# Patient Record
Sex: MALE | Race: BLACK OR AFRICAN AMERICAN | Employment: UNEMPLOYED | URBAN - METROPOLITAN AREA
[De-identification: names, ages, dates, MRNs, and addresses within clinical notes are randomized per-mention and may not be internally consistent; named-entity substitution may affect disease eponyms.]

---

## 2020-05-05 ENCOUNTER — HOSPITAL ENCOUNTER (INPATIENT)
Age: 50
LOS: 2 days | Discharge: HOME | DRG: 194 | End: 2020-05-08
Attending: EMERGENCY MEDICINE | Admitting: INTERNAL MEDICINE
Payer: MEDICAID

## 2020-05-05 ENCOUNTER — APPOINTMENT (OUTPATIENT)
Dept: GENERAL RADIOLOGY | Age: 50
DRG: 194 | End: 2020-05-05
Payer: MEDICAID

## 2020-05-05 LAB
ALBUMIN SERPL-MCNC: 4 G/DL (ref 3.5–5.2)
ALP BLD-CCNC: 87 U/L (ref 40–129)
ALT SERPL-CCNC: 62 U/L (ref 0–40)
ANION GAP SERPL CALCULATED.3IONS-SCNC: 11 MMOL/L (ref 7–16)
AST SERPL-CCNC: 59 U/L (ref 0–39)
BASOPHILS ABSOLUTE: 0.05 E9/L (ref 0–0.2)
BASOPHILS RELATIVE PERCENT: 0.5 % (ref 0–2)
BILIRUB SERPL-MCNC: 0.6 MG/DL (ref 0–1.2)
BUN BLDV-MCNC: 21 MG/DL (ref 6–20)
CALCIUM SERPL-MCNC: 8.5 MG/DL (ref 8.6–10.2)
CHLORIDE BLD-SCNC: 101 MMOL/L (ref 98–107)
CO2: 24 MMOL/L (ref 22–29)
CREAT SERPL-MCNC: 1.4 MG/DL (ref 0.7–1.2)
D DIMER: 214 NG/ML DDU
EOSINOPHILS ABSOLUTE: 0.74 E9/L (ref 0.05–0.5)
EOSINOPHILS RELATIVE PERCENT: 7.7 % (ref 0–6)
GFR AFRICAN AMERICAN: >60
GFR NON-AFRICAN AMERICAN: >60 ML/MIN/1.73
GLUCOSE BLD-MCNC: 127 MG/DL (ref 74–99)
HCT VFR BLD CALC: 38.9 % (ref 37–54)
HEMOGLOBIN: 13.3 G/DL (ref 12.5–16.5)
IMMATURE GRANULOCYTES #: 0.04 E9/L
IMMATURE GRANULOCYTES %: 0.4 % (ref 0–5)
INR BLD: 1.1
LYMPHOCYTES ABSOLUTE: 2.26 E9/L (ref 1.5–4)
LYMPHOCYTES RELATIVE PERCENT: 23.6 % (ref 20–42)
MCH RBC QN AUTO: 29.9 PG (ref 26–35)
MCHC RBC AUTO-ENTMCNC: 34.2 % (ref 32–34.5)
MCV RBC AUTO: 87.4 FL (ref 80–99.9)
MONOCYTES ABSOLUTE: 1.16 E9/L (ref 0.1–0.95)
MONOCYTES RELATIVE PERCENT: 12.1 % (ref 2–12)
NEUTROPHILS ABSOLUTE: 5.34 E9/L (ref 1.8–7.3)
NEUTROPHILS RELATIVE PERCENT: 55.7 % (ref 43–80)
PDW BLD-RTO: 12.6 FL (ref 11.5–15)
PLATELET # BLD: 270 E9/L (ref 130–450)
PMV BLD AUTO: 9.6 FL (ref 7–12)
POTASSIUM REFLEX MAGNESIUM: 4.1 MMOL/L (ref 3.5–5)
PRO-BNP: 3661 PG/ML (ref 0–125)
PROTHROMBIN TIME: 12.5 SEC (ref 9.3–12.4)
RBC # BLD: 4.45 E12/L (ref 3.8–5.8)
SODIUM BLD-SCNC: 136 MMOL/L (ref 132–146)
TOTAL PROTEIN: 7.4 G/DL (ref 6.4–8.3)
TROPONIN: <0.01 NG/ML (ref 0–0.03)
WBC # BLD: 9.6 E9/L (ref 4.5–11.5)

## 2020-05-05 PROCEDURE — 85610 PROTHROMBIN TIME: CPT

## 2020-05-05 PROCEDURE — 93005 ELECTROCARDIOGRAM TRACING: CPT | Performed by: EMERGENCY MEDICINE

## 2020-05-05 PROCEDURE — 85378 FIBRIN DEGRADE SEMIQUANT: CPT

## 2020-05-05 PROCEDURE — 71045 X-RAY EXAM CHEST 1 VIEW: CPT

## 2020-05-05 PROCEDURE — 85025 COMPLETE CBC W/AUTO DIFF WBC: CPT

## 2020-05-05 PROCEDURE — 80053 COMPREHEN METABOLIC PANEL: CPT

## 2020-05-05 PROCEDURE — 83880 ASSAY OF NATRIURETIC PEPTIDE: CPT

## 2020-05-05 PROCEDURE — 84484 ASSAY OF TROPONIN QUANT: CPT

## 2020-05-05 PROCEDURE — 99285 EMERGENCY DEPT VISIT HI MDM: CPT

## 2020-05-05 RX ORDER — CLINDAMYCIN HYDROCHLORIDE 300 MG/1
300 CAPSULE ORAL 4 TIMES DAILY
Status: ON HOLD | COMMUNITY
End: 2020-05-08 | Stop reason: HOSPADM

## 2020-05-05 RX ORDER — MULTIVIT-MIN/FERROUS GLUCONATE 9 MG/15 ML
15 LIQUID (ML) ORAL DAILY
COMMUNITY

## 2020-05-05 RX ORDER — CARVEDILOL 3.12 MG/1
3.12 TABLET ORAL 2 TIMES DAILY WITH MEALS
Status: ON HOLD | COMMUNITY
End: 2020-05-08 | Stop reason: HOSPADM

## 2020-05-05 RX ORDER — FUROSEMIDE 40 MG/1
40 TABLET ORAL DAILY
COMMUNITY

## 2020-05-05 RX ORDER — BUPROPION HYDROCHLORIDE 150 MG/1
150 TABLET ORAL EVERY MORNING
COMMUNITY

## 2020-05-05 RX ORDER — QUETIAPINE FUMARATE 100 MG/1
100 TABLET, FILM COATED ORAL NIGHTLY
COMMUNITY

## 2020-05-05 RX ORDER — LISINOPRIL 5 MG/1
5 TABLET ORAL DAILY
Status: ON HOLD | COMMUNITY
End: 2020-05-08 | Stop reason: HOSPADM

## 2020-05-05 RX ORDER — M-VIT,TX,IRON,MINS/CALC/FOLIC 27MG-0.4MG
1 TABLET ORAL DAILY
Status: ON HOLD | COMMUNITY
End: 2020-05-08 | Stop reason: HOSPADM

## 2020-05-05 RX ORDER — ACETAMINOPHEN 160 MG
TABLET,DISINTEGRATING ORAL DAILY
COMMUNITY

## 2020-05-05 ASSESSMENT — ENCOUNTER SYMPTOMS
ABDOMINAL PAIN: 0
SORE THROAT: 0
COUGH: 0
NAUSEA: 0
VOMITING: 0
COLOR CHANGE: 0
SHORTNESS OF BREATH: 1

## 2020-05-06 PROBLEM — R07.89 ATYPICAL CHEST PAIN: Status: ACTIVE | Noted: 2020-05-06

## 2020-05-06 PROBLEM — I50.23 HEART FAILURE, SYSTOLIC, WITH ACUTE DECOMPENSATION (HCC): Status: ACTIVE | Noted: 2020-05-06

## 2020-05-06 PROBLEM — K04.7 ABSCESSED TOOTH: Status: ACTIVE | Noted: 2020-05-06

## 2020-05-06 PROBLEM — N17.9 AKI (ACUTE KIDNEY INJURY) (HCC): Status: ACTIVE | Noted: 2020-05-06

## 2020-05-06 PROBLEM — I50.43 ACUTE ON CHRONIC SYSTOLIC AND DIASTOLIC HEART FAILURE, NYHA CLASS 1 (HCC): Status: ACTIVE | Noted: 2020-05-06

## 2020-05-06 PROBLEM — F14.10 COCAINE ABUSE (HCC): Status: ACTIVE | Noted: 2020-05-06

## 2020-05-06 LAB
ALBUMIN SERPL-MCNC: 3.9 G/DL (ref 3.5–5.2)
ALP BLD-CCNC: 87 U/L (ref 40–129)
ALT SERPL-CCNC: 56 U/L (ref 0–40)
AMPHETAMINE SCREEN, URINE: NOT DETECTED
ANION GAP SERPL CALCULATED.3IONS-SCNC: 12 MMOL/L (ref 7–16)
AST SERPL-CCNC: 39 U/L (ref 0–39)
BACTERIA: NORMAL /HPF
BARBITURATE SCREEN URINE: NOT DETECTED
BENZODIAZEPINE SCREEN, URINE: NOT DETECTED
BILIRUB SERPL-MCNC: 0.6 MG/DL (ref 0–1.2)
BILIRUBIN DIRECT: <0.2 MG/DL (ref 0–0.3)
BILIRUBIN URINE: NEGATIVE
BILIRUBIN, INDIRECT: ABNORMAL MG/DL (ref 0–1)
BLOOD, URINE: NEGATIVE
BUN BLDV-MCNC: 20 MG/DL (ref 6–20)
CALCIUM SERPL-MCNC: 8.8 MG/DL (ref 8.6–10.2)
CANNABINOID SCREEN URINE: NOT DETECTED
CHLORIDE BLD-SCNC: 100 MMOL/L (ref 98–107)
CLARITY: CLEAR
CO2: 24 MMOL/L (ref 22–29)
COCAINE METABOLITE SCREEN URINE: NOT DETECTED
COLOR: YELLOW
CREAT SERPL-MCNC: 1.4 MG/DL (ref 0.7–1.2)
EKG ATRIAL RATE: 101 BPM
EKG ATRIAL RATE: 94 BPM
EKG P AXIS: 116 DEGREES
EKG P AXIS: 60 DEGREES
EKG P-R INTERVAL: 144 MS
EKG P-R INTERVAL: 150 MS
EKG Q-T INTERVAL: 400 MS
EKG Q-T INTERVAL: 406 MS
EKG QRS DURATION: 96 MS
EKG QRS DURATION: 96 MS
EKG QTC CALCULATION (BAZETT): 500 MS
EKG QTC CALCULATION (BAZETT): 526 MS
EKG R AXIS: -161 DEGREES
EKG R AXIS: -33 DEGREES
EKG T AXIS: 0 DEGREES
EKG T AXIS: 131 DEGREES
EKG VENTRICULAR RATE: 101 BPM
EKG VENTRICULAR RATE: 94 BPM
FENTANYL SCREEN, URINE: NOT DETECTED
GFR AFRICAN AMERICAN: >60
GFR NON-AFRICAN AMERICAN: >60 ML/MIN/1.73
GLUCOSE BLD-MCNC: 110 MG/DL (ref 74–99)
GLUCOSE URINE: NEGATIVE MG/DL
KETONES, URINE: NEGATIVE MG/DL
LEUKOCYTE ESTERASE, URINE: NEGATIVE
Lab: NORMAL
MAGNESIUM: 2.1 MG/DL (ref 1.6–2.6)
METHADONE SCREEN, URINE: NOT DETECTED
NITRITE, URINE: NEGATIVE
OPIATE SCREEN URINE: NOT DETECTED
OXYCODONE URINE: NOT DETECTED
PH UA: 6.5 (ref 5–9)
PHENCYCLIDINE SCREEN URINE: NOT DETECTED
POTASSIUM REFLEX MAGNESIUM: 3.6 MMOL/L (ref 3.5–5)
PROTEIN UA: NEGATIVE MG/DL
RBC UA: NORMAL /HPF (ref 0–2)
SEDIMENTATION RATE, ERYTHROCYTE: 1 MM/HR (ref 0–15)
SODIUM BLD-SCNC: 136 MMOL/L (ref 132–146)
SPECIFIC GRAVITY UA: 1.01 (ref 1–1.03)
TOTAL PROTEIN: 7 G/DL (ref 6.4–8.3)
TROPONIN: <0.01 NG/ML (ref 0–0.03)
UROBILINOGEN, URINE: 0.2 E.U./DL
WBC UA: NORMAL /HPF (ref 0–5)

## 2020-05-06 PROCEDURE — 80048 BASIC METABOLIC PNL TOTAL CA: CPT

## 2020-05-06 PROCEDURE — 99222 1ST HOSP IP/OBS MODERATE 55: CPT | Performed by: INTERNAL MEDICINE

## 2020-05-06 PROCEDURE — 2580000003 HC RX 258: Performed by: NURSE PRACTITIONER

## 2020-05-06 PROCEDURE — 80076 HEPATIC FUNCTION PANEL: CPT

## 2020-05-06 PROCEDURE — 2580000003 HC RX 258: Performed by: INTERNAL MEDICINE

## 2020-05-06 PROCEDURE — 6360000002 HC RX W HCPCS: Performed by: NURSE PRACTITIONER

## 2020-05-06 PROCEDURE — 85651 RBC SED RATE NONAUTOMATED: CPT

## 2020-05-06 PROCEDURE — 36415 COLL VENOUS BLD VENIPUNCTURE: CPT

## 2020-05-06 PROCEDURE — 80307 DRUG TEST PRSMV CHEM ANLYZR: CPT

## 2020-05-06 PROCEDURE — 6360000002 HC RX W HCPCS: Performed by: INTERNAL MEDICINE

## 2020-05-06 PROCEDURE — 81001 URINALYSIS AUTO W/SCOPE: CPT

## 2020-05-06 PROCEDURE — 6370000000 HC RX 637 (ALT 250 FOR IP): Performed by: INTERNAL MEDICINE

## 2020-05-06 PROCEDURE — 1200000000 HC SEMI PRIVATE

## 2020-05-06 PROCEDURE — 6370000000 HC RX 637 (ALT 250 FOR IP): Performed by: NURSE PRACTITIONER

## 2020-05-06 PROCEDURE — APPSS180 APP SPLIT SHARED TIME > 60 MINUTES: Performed by: NURSE PRACTITIONER

## 2020-05-06 PROCEDURE — 84484 ASSAY OF TROPONIN QUANT: CPT

## 2020-05-06 PROCEDURE — 87088 URINE BACTERIA CULTURE: CPT

## 2020-05-06 PROCEDURE — 87081 CULTURE SCREEN ONLY: CPT

## 2020-05-06 PROCEDURE — 83735 ASSAY OF MAGNESIUM: CPT

## 2020-05-06 PROCEDURE — 93005 ELECTROCARDIOGRAM TRACING: CPT | Performed by: INTERNAL MEDICINE

## 2020-05-06 RX ORDER — QUETIAPINE FUMARATE 100 MG/1
100 TABLET, FILM COATED ORAL NIGHTLY
Status: DISCONTINUED | OUTPATIENT
Start: 2020-05-06 | End: 2020-05-08 | Stop reason: HOSPADM

## 2020-05-06 RX ORDER — NICOTINE 21 MG/24HR
1 PATCH, TRANSDERMAL 24 HOURS TRANSDERMAL DAILY
Status: DISCONTINUED | OUTPATIENT
Start: 2020-05-06 | End: 2020-05-08 | Stop reason: HOSPADM

## 2020-05-06 RX ORDER — POTASSIUM CHLORIDE 20 MEQ/1
40 TABLET, EXTENDED RELEASE ORAL PRN
Status: DISCONTINUED | OUTPATIENT
Start: 2020-05-06 | End: 2020-05-08 | Stop reason: HOSPADM

## 2020-05-06 RX ORDER — FUROSEMIDE 10 MG/ML
20 INJECTION INTRAMUSCULAR; INTRAVENOUS 2 TIMES DAILY
Status: DISCONTINUED | OUTPATIENT
Start: 2020-05-06 | End: 2020-05-07

## 2020-05-06 RX ORDER — SODIUM CHLORIDE 9 MG/ML
INJECTION, SOLUTION INTRAVENOUS EVERY 12 HOURS
Status: DISCONTINUED | OUTPATIENT
Start: 2020-05-06 | End: 2020-05-08 | Stop reason: HOSPADM

## 2020-05-06 RX ORDER — ONDANSETRON 2 MG/ML
4 INJECTION INTRAMUSCULAR; INTRAVENOUS EVERY 6 HOURS PRN
Status: DISCONTINUED | OUTPATIENT
Start: 2020-05-06 | End: 2020-05-06

## 2020-05-06 RX ORDER — POTASSIUM CHLORIDE 20 MEQ/1
40 TABLET, EXTENDED RELEASE ORAL ONCE
Status: COMPLETED | OUTPATIENT
Start: 2020-05-06 | End: 2020-05-06

## 2020-05-06 RX ORDER — SODIUM CHLORIDE 0.9 % (FLUSH) 0.9 %
10 SYRINGE (ML) INJECTION PRN
Status: DISCONTINUED | OUTPATIENT
Start: 2020-05-06 | End: 2020-05-08 | Stop reason: HOSPADM

## 2020-05-06 RX ORDER — ACETAMINOPHEN 325 MG/1
650 TABLET ORAL EVERY 6 HOURS PRN
Status: DISCONTINUED | OUTPATIENT
Start: 2020-05-06 | End: 2020-05-08 | Stop reason: HOSPADM

## 2020-05-06 RX ORDER — LISINOPRIL 5 MG/1
5 TABLET ORAL DAILY
Status: DISCONTINUED | OUTPATIENT
Start: 2020-05-06 | End: 2020-05-08

## 2020-05-06 RX ORDER — SODIUM CHLORIDE 0.9 % (FLUSH) 0.9 %
10 SYRINGE (ML) INJECTION EVERY 12 HOURS SCHEDULED
Status: DISCONTINUED | OUTPATIENT
Start: 2020-05-06 | End: 2020-05-08 | Stop reason: HOSPADM

## 2020-05-06 RX ORDER — CARVEDILOL 3.12 MG/1
3.12 TABLET ORAL 2 TIMES DAILY WITH MEALS
Status: DISCONTINUED | OUTPATIENT
Start: 2020-05-06 | End: 2020-05-07

## 2020-05-06 RX ORDER — ACETAMINOPHEN 650 MG/1
650 SUPPOSITORY RECTAL EVERY 6 HOURS PRN
Status: DISCONTINUED | OUTPATIENT
Start: 2020-05-06 | End: 2020-05-08 | Stop reason: HOSPADM

## 2020-05-06 RX ORDER — MAGNESIUM SULFATE 1 G/100ML
1 INJECTION INTRAVENOUS PRN
Status: DISCONTINUED | OUTPATIENT
Start: 2020-05-06 | End: 2020-05-08 | Stop reason: HOSPADM

## 2020-05-06 RX ORDER — BUPROPION HYDROCHLORIDE 150 MG/1
150 TABLET ORAL EVERY MORNING
Status: DISCONTINUED | OUTPATIENT
Start: 2020-05-06 | End: 2020-05-08 | Stop reason: HOSPADM

## 2020-05-06 RX ORDER — SPIRONOLACTONE 25 MG/1
25 TABLET ORAL DAILY
Status: DISCONTINUED | OUTPATIENT
Start: 2020-05-06 | End: 2020-05-08 | Stop reason: HOSPADM

## 2020-05-06 RX ORDER — FUROSEMIDE 10 MG/ML
40 INJECTION INTRAMUSCULAR; INTRAVENOUS ONCE
Status: COMPLETED | OUTPATIENT
Start: 2020-05-06 | End: 2020-05-06

## 2020-05-06 RX ORDER — POTASSIUM CHLORIDE 7.45 MG/ML
10 INJECTION INTRAVENOUS PRN
Status: DISCONTINUED | OUTPATIENT
Start: 2020-05-06 | End: 2020-05-08 | Stop reason: HOSPADM

## 2020-05-06 RX ADMIN — POTASSIUM CHLORIDE 40 MEQ: 1500 TABLET, EXTENDED RELEASE ORAL at 09:33

## 2020-05-06 RX ADMIN — FUROSEMIDE 20 MG: 10 INJECTION, SOLUTION INTRAVENOUS at 18:27

## 2020-05-06 RX ADMIN — FUROSEMIDE 40 MG: 10 INJECTION, SOLUTION INTRAMUSCULAR; INTRAVENOUS at 03:09

## 2020-05-06 RX ADMIN — FUROSEMIDE 20 MG: 10 INJECTION, SOLUTION INTRAVENOUS at 09:33

## 2020-05-06 RX ADMIN — CARVEDILOL 3.12 MG: 3.12 TABLET, FILM COATED ORAL at 16:03

## 2020-05-06 RX ADMIN — AMPICILLIN SODIUM AND SULBACTAM SODIUM 3 G: 2; 1 INJECTION, POWDER, FOR SOLUTION INTRAMUSCULAR; INTRAVENOUS at 23:51

## 2020-05-06 RX ADMIN — AMPICILLIN SODIUM AND SULBACTAM SODIUM 3 G: 2; 1 INJECTION, POWDER, FOR SOLUTION INTRAMUSCULAR; INTRAVENOUS at 13:04

## 2020-05-06 RX ADMIN — BUPROPION HYDROCHLORIDE 150 MG: 150 TABLET, FILM COATED, EXTENDED RELEASE ORAL at 10:28

## 2020-05-06 RX ADMIN — QUETIAPINE FUMARATE 100 MG: 100 TABLET ORAL at 21:08

## 2020-05-06 RX ADMIN — LISINOPRIL 5 MG: 5 TABLET ORAL at 09:33

## 2020-05-06 RX ADMIN — SODIUM CHLORIDE, PRESERVATIVE FREE 10 ML: 5 INJECTION INTRAVENOUS at 09:34

## 2020-05-06 RX ADMIN — ACETAMINOPHEN 650 MG: 325 TABLET, FILM COATED ORAL at 16:03

## 2020-05-06 RX ADMIN — SODIUM CHLORIDE, PRESERVATIVE FREE 10 ML: 5 INJECTION INTRAVENOUS at 21:09

## 2020-05-06 RX ADMIN — ENOXAPARIN SODIUM 40 MG: 40 INJECTION SUBCUTANEOUS at 09:33

## 2020-05-06 RX ADMIN — CARVEDILOL 3.12 MG: 3.12 TABLET, FILM COATED ORAL at 09:33

## 2020-05-06 RX ADMIN — SPIRONOLACTONE 25 MG: 25 TABLET ORAL at 09:33

## 2020-05-06 RX ADMIN — AMPICILLIN SODIUM AND SULBACTAM SODIUM 3 G: 2; 1 INJECTION, POWDER, FOR SOLUTION INTRAMUSCULAR; INTRAVENOUS at 18:27

## 2020-05-06 ASSESSMENT — PAIN DESCRIPTION - PAIN TYPE: TYPE: ACUTE PAIN

## 2020-05-06 ASSESSMENT — PAIN DESCRIPTION - DESCRIPTORS: DESCRIPTORS: HEADACHE

## 2020-05-06 ASSESSMENT — PAIN DESCRIPTION - ORIENTATION: ORIENTATION: RIGHT;LEFT

## 2020-05-06 ASSESSMENT — PAIN DESCRIPTION - FREQUENCY: FREQUENCY: CONTINUOUS

## 2020-05-06 ASSESSMENT — PAIN DESCRIPTION - PROGRESSION: CLINICAL_PROGRESSION: NOT CHANGED

## 2020-05-06 ASSESSMENT — PAIN SCALES - GENERAL
PAINLEVEL_OUTOF10: 0
PAINLEVEL_OUTOF10: 0
PAINLEVEL_OUTOF10: 7

## 2020-05-06 ASSESSMENT — PAIN DESCRIPTION - ONSET: ONSET: ON-GOING

## 2020-05-06 ASSESSMENT — PAIN DESCRIPTION - LOCATION: LOCATION: BUTTOCKS;HEAD

## 2020-05-06 ASSESSMENT — PAIN - FUNCTIONAL ASSESSMENT: PAIN_FUNCTIONAL_ASSESSMENT: ACTIVITIES ARE NOT PREVENTED

## 2020-05-06 NOTE — ED NOTES
Nicho FELIX at [facility] made aware that pt is admitted to hospital.      Prieto Buchanan RN  05/06/20 0206

## 2020-05-06 NOTE — CONSULTS
04/06/2020: Severely dilated left ventricle with severe global systolic dysfunction. Estimated EF 25%. Prominent LV trabeculation, consider non compaction. Diastolic filling pattern is consistent with grade 2 diastolic dysfunction   (pseudonormal) and elevated mean LA pressure. Mildly dilated right ventricle with normal systolic function. The left atrium is severely dilated. Mild to moderate MR. Mild AR. Mild TR. Calculated RVSP is 67 mmHg consistent with moderate pulmonary HTN. There is no pericardial effusion. Normal aortic root dimensions. 2. Cardiac catheterization 04/20/2020: Normal coronary arteries. Severely elevated LVEDP. 3. HFrEF hospitalization 03/2020  4. HTN  5. Dyslipidemia (Total cholesterol 150, HDL 60, LDL 55, Triglycerides 177 on 03/16/2020)  6. Depression   7. Bipolar Disorder  8. Hospitalization with suicidal ideations in 03/2019   9. Polysubstance Abuse including Crack cocaine and tobacco abuse      Medications Prior to admit:  Prior to Admission medications    Medication Sig Start Date End Date Taking?  Authorizing Provider   buPROPion (WELLBUTRIN XL) 150 MG extended release tablet Take 150 mg by mouth every morning   Yes Historical Provider, MD   carvedilol (COREG) 3.125 MG tablet Take 3.125 mg by mouth 2 times daily (with meals)   Yes Historical Provider, MD   QUEtiapine (SEROQUEL) 100 MG tablet Take 100 mg by mouth nightly   Yes Historical Provider, MD   furosemide (LASIX) 40 MG tablet Take 40 mg by mouth daily   Yes Historical Provider, MD   Cholecalciferol (VITAMIN D3) 50 MCG (2000 UT) CAPS Take by mouth daily   Yes Historical Provider, MD   lisinopril (PRINIVIL;ZESTRIL) 5 MG tablet Take 5 mg by mouth daily   Yes Historical Provider, MD   Multiple Vitamins-Minerals (CENTRUM/CERTA-JAYNA WITH MINERALS ORAL) solution Take 15 mLs by mouth daily   Yes Historical Provider, MD   clindamycin (CLEOCIN) 300 MG capsule Take 300 mg by mouth 4 times daily   Yes Historical Provider, MD   Multiple Vitamins-Minerals (THERAPEUTIC MULTIVITAMIN-MINERALS) tablet Take 1 tablet by mouth daily   Yes Historical Provider, MD       Current Medications:    Current Facility-Administered Medications: buPROPion (WELLBUTRIN XL) extended release tablet 150 mg, 150 mg, Oral, QAM  carvedilol (COREG) tablet 3.125 mg, 3.125 mg, Oral, BID WC  lisinopril (PRINIVIL;ZESTRIL) tablet 5 mg, 5 mg, Oral, Daily  QUEtiapine (SEROQUEL) tablet 100 mg, 100 mg, Oral, Nightly  sodium chloride flush 0.9 % injection 10 mL, 10 mL, Intravenous, 2 times per day  sodium chloride flush 0.9 % injection 10 mL, 10 mL, Intravenous, PRN  potassium chloride (KLOR-CON M) extended release tablet 40 mEq, 40 mEq, Oral, PRN **OR** potassium bicarb-citric acid (EFFER-K) effervescent tablet 40 mEq, 40 mEq, Oral, PRN **OR** potassium chloride 10 mEq/100 mL IVPB (Peripheral Line), 10 mEq, Intravenous, PRN  magnesium sulfate 1 g in dextrose 5% 100 mL IVPB, 1 g, Intravenous, PRN  acetaminophen (TYLENOL) tablet 650 mg, 650 mg, Oral, Q6H PRN **OR** acetaminophen (TYLENOL) suppository 650 mg, 650 mg, Rectal, Q6H PRN  magnesium hydroxide (MILK OF MAGNESIA) 400 MG/5ML suspension 30 mL, 30 mL, Oral, Daily PRN  enoxaparin (LOVENOX) injection 40 mg, 40 mg, Subcutaneous, Daily  ondansetron (ZOFRAN) injection 4 mg, 4 mg, Intravenous, Q6H PRN  nicotine (NICODERM CQ) 14 MG/24HR 1 patch, 1 patch, Transdermal, Daily  spironolactone (ALDACTONE) tablet 25 mg, 25 mg, Oral, Daily  furosemide (LASIX) injection 20 mg, 20 mg, Intravenous, BID  potassium chloride (KLOR-CON M) extended release tablet 40 mEq, 40 mEq, Oral, Once    Allergies:  Patient has no known allergies. Social History:    Currently smoking 3 cigarettes a day and has \"smoked more for years\". Denies alcohol use. States that he used Crack cocaine (denies IV form), states that he is seeking recovery now. Tox screen from 03/2020 and 04/2020 both positive. Family History:   Denies family Hx of premature CAD.  States Ausculation- Regular rate and rhythm, no murmur. No s3, s4 or rub   PV: No lower extremity edema. No varicosities. Pedal pulses palpable, no clubbing or cyanosis   ABDOMEN: Soft, non-tender to light palpation. Bowel sounds present. No palpable masses no organomegaly; no abdominal bruit  MS: Good muscle strength and tone. No atrophy or abnormal movements. : Deferred  SKIN: Warm and dry no statis dermatitis or ulcers   NEURO / PSYCH: Oriented to person, place and time. Speech clear and appropriate. Follows all commands. Pleasant affect     DATA:    ECG 05/06/2020: ST with anterolateral T wave inversions. QTc 500ms  Tele strips: ST with   Diagnostic:      Intake/Output Summary (Last 24 hours) at 5/6/2020 0921  Last data filed at 5/6/2020 4195  Gross per 24 hour   Intake --   Output 900 ml   Net -900 ml       Labs:   CBC:   Recent Labs     05/05/20 2223   WBC 9.6   HGB 13.3   HCT 38.9        BMP:   Recent Labs     05/05/20 2223 05/06/20  0517    136   K 4.1 3.6   CO2 24 24   BUN 21* 20   CREATININE 1.4* 1.4*   LABGLOM >60 >60   CALCIUM 8.5* 8.8     Mag:   Recent Labs     05/06/20  0517   MG 2.1   PT/INR:   Recent Labs     05/05/20 2223   PROTIME 12.5*   INR 1.1   CARDIAC ENZYMES:  Recent Labs     05/05/20 2223 05/06/20  0102   TROPONINI <0.01 <0.01     Recent Labs     05/05/20 2223 05/06/20  0517   AST 59* 39   ALT 62* 56*   LABALBU 4.0 3.9     CXR 05/05/2020:   Mild-moderate pulmonary edema    IMPRESSION:   1. Acute HFrEF  2. Atypical chest pain, negative Troponin x 2 with no acute EKG changes  3. Nonischemic Cardiomyopathy with patent coronary arteries on cath 04/20/2020, EF 25% per echo 04/06/2020  4. HTN: Stable  5. Hypertriglyceridemia  6. Current tobacco abuse: Smoking cessation advised  7. Crack cocaine use, currently enrolled in rehab setting  8. Depression/Bipolar Disorder/Hx of suicidal ideations  9.  Oral pain with complaints of cracked tooth with drainage (on second antibiotic as outpatient)  10. Sinus Tachycardia  11. QTc 500ms, on Seroquel   12. Questionable ASHLEY with BUN/SCr 20/1.4 (SCr noted to be 1.3 in 04/2020)  13. Hypokalemia, supplemented    PLAN:   1. Monitor daily weight, I&O, BMP; Continue IV Lasix  2. Monitor daily EKG  3. Continue Coreg and Lisinopril (as renal function tolerate)  4. Aldactone added per Primary Service   5. Consider Life Vest, patient wishes to think about it  6. Will discuss case with Dr. Domenic Patel     Electronically signed by SEMAJ iNx CNP on 5/6/2020 at 9:21 [facility] Cardiology Consult       Libra Field    I have personally participated in a face-to-face and personally obtained history and performed physical exam on the date of service. I reviewed chart, vitals, labs and radiologic studies. I also participated in medical decision making with SEMAJ Nix CNP on the date of service All of the assessments and recommendations are from me and I agree with all of the pertinent clinical information, assessment and treatment plan. I have reviewed and edited the note above based on my findings during my history, exam, and decision making. Please see my additional contributions to the history, physical exam, assessment, and recommendations below. Reason for Consult: CHF  Requesting Physician: SEMAJ Ross CNP  Chief Complaint: Shortness of breath  History Obtained From: Patient and electronic medical records    HISTORY OF PRESENT ILLNESS:   Patient is 45years old male with history of nonischemic cardiomyopathy hypertension, hyperlipidemia, tobacco abuse, substance abuse, was admitted to the hospital with worsening shortness of breath, patient was found to have acute exacerbation of congestive heart failure, cardiology was consulted.   Symptoms started couple of weeks ago, started to gradually, getting progressively worse, on the day of admission symptoms are significant enough that prompted him to seek medical attention, he did not try any treatment for his symptoms, symptoms did not respond to his outpatient therapy, stated that he has not been compliant with his dietary restrictions, associated symptoms included easy fatigability, orthopnea, denies any PND, no lightheadedness or dizziness, no pedal edema, no syncope, no presyncopal episodes. Past Medical History:   Diagnosis Date    CHF (congestive heart failure) ([address]     Hypertension        History reviewed. No pertinent surgical history.       Current Facility-Administered Medications:     buPROPion (WELLBUTRIN XL) extended release tablet 150 mg, 150 mg, Oral, QAM, Fahad Vásquez MD, 150 mg at 05/06/20 1028    carvedilol (COREG) tablet 3.125 mg, 3.125 mg, Oral, BID WC, Fahad Vásquez MD, 3.125 mg at 05/06/20 1603    lisinopril (PRINIVIL;ZESTRIL) tablet 5 mg, 5 mg, Oral, Daily, Yazmin Coon MD, 5 mg at 05/06/20 0933    QUEtiapine (SEROQUEL) tablet 100 mg, 100 mg, Oral, Nightly, Fahad Vásquez MD    sodium chloride flush 0.9 % injection 10 mL, 10 mL, Intravenous, 2 times per day, Yazmin Coon MD, 10 mL at 05/06/20 0934    sodium chloride flush 0.9 % injection 10 mL, 10 mL, Intravenous, PRN, Yazmin Coon MD    potassium chloride (KLOR-CON M) extended release tablet 40 mEq, 40 mEq, Oral, PRN **OR** potassium bicarb-citric acid (EFFER-K) effervescent tablet 40 mEq, 40 mEq, Oral, PRN **OR** potassium chloride 10 mEq/100 mL IVPB (Peripheral Line), 10 mEq, Intravenous, PRN, Yazmin Coon MD    magnesium sulfate 1 g in dextrose 5% 100 mL IVPB, 1 g, Intravenous, PRN, Yazmin Coon MD    acetaminophen (TYLENOL) tablet 650 mg, 650 mg, Oral, Q6H PRN, 650 mg at 05/06/20 1603 **OR** acetaminophen (TYLENOL) suppository 650 mg, 650 mg, Rectal, Q6H PRN, Yazmin Coon MD    magnesium hydroxide (MILK OF MAGNESIA) 400 MG/5ML suspension 30 mL, 30 mL, Oral, Daily PRN, Yazmin Coon MD    enoxaparin (LOVENOX) injection 40 mg, 40 mg, Subcutaneous, Daily, Fahad Vásquez MD, 40 mg at 05/06/20 0962   nicotine (NICODERM CQ) 14 MG/24HR 1 patch, 1 patch, Transdermal, Daily, Fahad Vásquez MD    spironolactone (ALDACTONE) tablet 25 mg, 25 mg, Oral, Daily, Fahad Vásquez MD, 25 mg at 05/06/20 0933    furosemide (LASIX) injection 20 mg, 20 mg, Intravenous, BID, Fahad Vásquez MD, 20 mg at 05/06/20 0933    [DISCONTINUED] cefepime (MAXIPIME) 2 g IVPB extended (mini-bag), 2 g, Intravenous, Q12H **AND** 0.9 % sodium chloride infusion, , Intravenous, Q12H, Doris Osmar, APRN - CNP    ampicillin-sulbactam (UNASYN) 3 g ivpb minibag, 3 g, Intravenous, Q6H, Doris Osmar, APRN - CNP, Stopped at 05/06/20 1334    Allergies as of 05/05/2020    (Not on File)       Social History     Socioeconomic History    Marital status: Single     Spouse name: Not on file    Number of children: Not on file    Years of education: Not on file    Highest education level: Not on file   Occupational History    Not on file   Social Needs    Financial resource strain: Not on file    Food insecurity     Worry: Not on file     Inability: Not on file   [employer] needs     Medical: Not on file     Non-medical: Not on file   Tobacco Use    Smoking status: Current Some Day Smoker     Packs/day: 0.50     Types: Cigarettes    Smokeless tobacco: Never Used   Substance and Sexual Activity    Alcohol use: Not Currently    Drug use: Yes     Types: Cocaine     Comment: last was over a onth ago    Sexual activity: Not on file   Lifestyle    Physical activity     Days per week: Not on file     Minutes per session: Not on file    Stress: Not on file   Relationships    Social connections     Talks on phone: Not on file     Gets together: Not on file     Attends Denominational service: Not on file     Active member of club or organization: Not on file     Attends meetings of clubs or organizations: Not on file     Relationship status: Not on file    Intimate partner violence     Fear of current or ex partner: Not on file     Emotionally abused: Not on file     Physically abused: Not on file     Forced sexual activity: Not on file   Other Topics Concern    Not on file   Social History Narrative    Not on file       Family History   Problem Relation Age of Onset    Stroke Maternal Grandmother     Stroke Maternal Uncle        REVIEW OF SYSTEMS:     CONSTITUTIONAL:  negative for  fevers, chills, sweats, + fatigue  EYES:  negative for  double vision, blurred vision and blind spots  HEENT:  negative for  tinnitus, earaches, nasal congestion and epistaxis  RESPIRATORY:  negative for  dry cough, cough with sputum,  wheezing and hemoptysis  CARDIOVASCULAR: as per HPI  GASTROINTESTINAL:  negative for nausea, vomiting, diarrhea, constipation, pruritus and jaundice  GENITOURINARY:  negative for frequency, dysuria, nocturia, urinary incontinence and hesitancy  HEMATOLOGIC/LYMPHATIC:  negative for easy bruising, bleeding, lymphadenopathy and petechiae  ALLERGIC/IMMUNOLOGIC:  negative for urticaria, hay fever and angioedema  ENDOCRINE:  negative for heat intolerance, cold intolerance, tremor, hair loss and diabetic symptoms including neither polyuria nor polydipsia nor blurred vision  MUSCULOSKELETAL:  negative for  myalgias, arthralgias, joint swelling, stiff joints and decreased range of motion  NEUROLOGICAL:  negative for memory problems, speech problems, visual disturbance, dysphagia, weakness and numbness      PHYSICAL EXAM:   CONSTITUTIONAL:  awake, alert, cooperative, no apparent distress, and appears stated age  EYES:  lids and lashes normal and pupils equal, round and reactive to light, anicteric sclerae  HEAD:  normocepalic, without obvious abnormality, atraumatic, pink, moist mucous membranes.   NECK:  Supple, symmetrical, trachea midline, no adenopathy, thyroid symmetric, not enlarged and no tenderness, skin normal  HEMATOLOGIC/LYMPHATICS:  no cervical lymphadenopathy and no supraclavicular lymphadenopathy  LUNGS:  No increased work of breathing, good air the consult  Electronically signed by Daily Smith MD on 5/6/2020 at 4:16 PM  NOTE: This report was transcribed using voice recognition software.  Every effort was made to ensure accuracy; however, inadvertent computerized transcription errors may be present

## 2020-05-06 NOTE — CONSULTS
[address] [place] [address] [facility]  Consult Note    [address] [facility] [address]  [place], [place] [employer]  Phone [phone]   [phone]      Admit Date: 2020 10:09 PM  Pt Name: Mo Li  MRN: [id number]  : 1970  Reason for Consult:   Abscessed tooth   Chief Complaint   Patient presents with    Chest Pain     mid chest pain, feels like someone is sitting on chest    Shortness of Breath     Requesting Physician:  Nichelle Little MD  PCP: No primary care provider on file. History Obtained From:  patient  ID consulted for Atypical chest pain [R07.89]  Acute on chronic systolic and diastolic heart failure, NYHA class 1 (HCC) [I50.43]  on hospital day 0   Face to face encounter   [facility]       Chief Complaint   Patient presents with    Chest Pain     mid chest pain, feels like someone is sitting on chest    Shortness of Breath     HISTORYOF PRESENT ILLNESS      Mo Li is a 45 y.o. male who presents with significant past medical history of  has a past medical history of CHF (congestive heart failure) ([address] and Hypertension. who presents with   Chief Complaint   Patient presents with    Chest Pain     mid chest pain, feels like someone is sitting on chest    Shortness of Breath     ED TRIAGEVITALS  BP: 119/88, Temp: 97.6 °F (36.4 °C), Pulse: 106, Resp: 22, SpO2: 95 %  HPI  Patient presented to [facility] for Chest pain. Patient is currently in rehab for history of substance abuse with cocaine. Patient reports he is from [facility] and is here for rehab- he has been sober for 30 days. He used crack cocaine- denies any IV drug use. He reports having issues with his mouth starting about 3 weeks ago. He reports his tooth is cracked and started to become painful- he also noticed enlarged gland to his right side of neck/ lymph node around the same time- it became very tender. He was given what he believes is penicillin and then was started on clindamycin most recently.   He allergies. There is no immunization history on file for this patient. PAST MEDICAL HISTORY     Past Medical History:   Diagnosis Date    [address] ([address]estive heart failure) [address] [address]     Hypertension      SURGICAL HISTORY     History reviewed. No pertinent surgical history. FAMILY HISTORY       Family History   Problem Relation Age of Onset    Stroke Maternal Grandmother     Stroke Maternal Uncle      SOCIAL HISTORY       Social History     Socioeconomic History    Marital status: Single     Spouse name: None    Number of children: None    Years of education: None    Highest education level: None   Occupational History    None   Social Needs    Financial resource strain: None    Food insecurity     Worry: None     Inability: None    Transportation needs     Medical: None     Non-medical: None   Tobacco Use    Smoking status: Current Some Day Smoker     Packs/day: 0.50     Types: Cigarettes    Smokeless tobacco: Never Used   Substance and Sexual Activity    Alcohol use: Not Currently    Drug use: Yes     Types: Cocaine     Comment: last was over a onth ago    Sexual activity: None   Lifestyle    Physical activity     Days per week: None     Minutes per session: None    Stress: None   Relationships    Social connections     Talks on phone: None     Gets together: None     Attends Restorationist service: None     Active member of club or organization: None     Attends meetings of clubs or organizations: None     Relationship status: None    Intimate partner violence     Fear of current or ex partner: None     Emotionally abused: None     Physically abused: None     Forced sexual activity: None   Other Topics Concern    None   Social History Narrative    None     · He is from [place]   · He is in rehab.     PHYSICAL EXAM    (up to 7 for level 4, 8 or more forlevel 5)     ED Triage Vitals [05/05/20 2212]   BP Temp Temp Source Pulse Resp SpO2 Height Weight   116/79 98.9 °F (37.2 °C) Oral 105 14 96 % 6' 1\" MICROBIOLOGY:  Cultures :   No new cultures   Patient is a 45 y.o. male who presented with   Chief Complaint   Patient presents with    Chest Pain     mid chest pain, feels like someone is sitting on chest    Shortness of Breath   History of ++ Rhinovirus/ Enterovirus -4/5/2020  Hep C negative     FINAL IMPRESSION      1. Chest pain, unspecified type    2. Acute pulmonary edema (HCC)    · Dental abscess     · Failed outpt therapy- with PO atbs - clindamycin and PCN  · History of polysubstance abuse      Plan:   · Currently on IV unasyn and vancomycin  · Pharmacy dosing - will stop vancomycin   · MRSA screen  · To follow with dental clinic as an outpt   · Monitor labs    Imaging and labs were reviewed per medical records and any ID pertinent labs were addressed with the patient. The patient/FAMILY  was educated about the diagnosis, prognosis, indications, risks and benefits of treatment. An opportunity to ask questions was given to the patient/FAMILY and questions were answered. Thank you for the consult. We will follow with you. Electronically signed by SEMAJ Carnes on 5/6/2020 at 11:51 AM      As above    I have performed and participated in the history, exam, assessment and plan on Mandy Leon today with NP. Pt is in drug rehab  [facility]  Pulmonary nodule/heart failure/cocaine abuse-dilated CMP  S/p admission rhinovirus/tb neg  Has had neg hiv/hep c/covid 19 screen  He has sob/dental pain right side with facial swelling failed oral clinda/pcn  He has no f/c/n/v/d  Cr1.4 alt62 ast59     Cont unasyn  Will see dental next week for further eval   mrsa screen can prob stop vanco   Check other cx    Pertinent notes, labs and imaging were reviewed. POC was discussed with patient including medications and side effects    Pt had the opportunity to ask questions. All questions were answered.       Electronically signed by Soraida Velarde MD on

## 2020-05-06 NOTE — ED PROVIDER NOTES
Constitutional:       General: He is not in acute distress. HENT:      Head: Normocephalic and atraumatic. Nose: Nose normal.      Mouth/Throat:      Mouth: Mucous membranes are moist.   Eyes:      General: No scleral icterus. Conjunctiva/sclera: Conjunctivae normal.   Neck:      Musculoskeletal: Normal range of motion and neck supple. Cardiovascular:      Rate and Rhythm: Regular rhythm. Tachycardia present. Pulses: Normal pulses. Radial pulses are 2+ on the right side and 2+ on the left side. Dorsalis pedis pulses are 2+ on the right side and 2+ on the left side. Heart sounds: No murmur. Pulmonary:      [place]: Pulmonary effort is normal.      Breath sounds: Rales present. No wheezing or rhonchi. Chest:      Chest wall: No tenderness. Abdominal:      General: Bowel sounds are normal.      Palpations: Abdomen is soft. Abdomen is not rigid. There is no pulsatile mass. Tenderness: There is no abdominal tenderness. There is no guarding or rebound. Musculoskeletal:      Right lower leg: No edema. Left lower leg: No edema. Comments: Moves all extremities x 4   Skin:     General: Skin is warm and dry. Capillary Refill: Capillary refill takes less than 2 seconds. Coloration: Skin is not jaundiced. Neurological:      General: No focal deficit present. Mental Status: He is alert and oriented to person, place, and time. Psychiatric:         Mood and Affect: Mood normal.         Speech: Speech normal.          Procedures     MDM  Number of Diagnoses or Management Options  Acute pulmonary edema [facility]):   Chest pain, unspecified type:   Diagnosis management comments: Patient presents to the ED for above signs and symptoms. Patient was given aspirin and nitro prior to arrival for their symptoms with moderate improvement. Results of blood work unremarkable. EKG concerning with T wave inversions without any ST elevations as a STEMI was not called. risk of adverse cardiac event. In the HEART Score, these patients were admitted to the hospital.    (11.6% retrospective) (16.6% prospective)  > or =7: 50-65% risk of adverse cardiac event. In the HEART Score, these patients were candidates for early invasive  measures.    (65.2% retrospective) (50.1% prospective)    MACE (Major Adverse Cardiac Events) is defined as: all-cause mortality, myocardial infarction, or coronary revascularization. Well's Criteria for Pulmonary Embolism  Clinical Signs and Symptoms of DVT? no   PE Is #1 Diagnosis, or Equally Likely no   Heart Rate > 100? Yes (+1.5)   Immobilization at least 3 days, or Surgery in the previous 4 weeks? no   Previous, objectively diagnosed PE or DVT? no   Hemoptysis? no   Malignancy w/ Treatment within 6 mo, or palliative? no   Total  1.5     <2 points= low risk (3.4%)  2-6 points= moderate risk (27.8%)  >6 points= high risk (78.4%)    ----------------------------------------------- PAST HISTORY --------------------------------------------  Past Medical History:  has a past medical history of CHF (congestive heart failure) ([address] and Hypertension. Past Surgical History:  has no past surgical history on file. Social History:  reports that he has been smoking cigarettes. He has been smoking about 0.50 packs per day. He has never used smokeless tobacco. He reports previous alcohol use. He reports current drug use. Drug: Cocaine. Family History: family history includes Stroke in his maternal grandmother and maternal uncle. The patients home medications have been reviewed.     Allergies: Patient has no allergy information on record.    ------------------------------------------------ RESULTS ---------------------------------------------------    LABS:  Results for orders placed or performed during the hospital encounter of 05/05/20   CBC Auto Differential   Result Value Ref Range    WBC 9.6 4.5 - 11.5 E9/L    RBC 4.45 3.80 - 5.80 E12/L EKG is signed and interpreted by ED Physician. Time: 2219  Rate: 101  Rhythm: Sinus. Interpretation: Sinus tachycardia, right axis deviation, diffuse T wave inversions, qtc prolongation  Comparison: no previous EKG.    ---------------------------- NURSING NOTES AND VITALS REVIEWED -------------------------   The nursing notes within the ED encounter and vital signs as below have been reviewed. /79   Pulse 105   Temp 98.9 °F (37.2 °C) (Oral)   Resp 14   Ht 6' 1\" (1.854 m)   Wt 200 lb (90.7 kg)   SpO2 96%   BMI 26.39 kg/m²   Oxygen Saturation Interpretation: Normal      ------------------------------------------PROGRESS NOTES -------------------------------------------    ED COURSE MEDICATIONS:              Medications - No data to display    CONSULTATIONS:            Medicine. PROCEDURES:            none. COUNSELING:   I have spoken with the patient and discussed todays results, in addition to providing specific details for the plan of care and counseling regarding the diagnosis and prognosis.     --------------------------------------- IMPRESSION & DISPOSITION --------------------------------     IMPRESSION(s):  1. Chest pain, unspecified type    2. Acute pulmonary edema ([address]        This patient's ED course included: a personal history and physicial examination, cardiac monitoring and continuous pulse oximetry    This patient has improved and been closely monitored during their ED course. DISPOSITION:  Disposition: Admit to telemetry. Patient condition is stable. END OF PROVIDER NOTE.         Pita Sullivan DO  Resident  05/06/20 6238

## 2020-05-06 NOTE — H&P
10 Philip Gil: had concerns including Chest Pain (mid chest pain, feels like someone is sitting on chest) and Shortness of Breath. History of Present Illness:    Informant(s) for H&P:Pt and chart   Melinda Duran is a 45 y.o. male with PMH of (CHF, cocaine abuse) presented from addiction rehab facility to the ER in [facility] with 1 day history of left side chest pain, pressure like, started while he was sitting, the pain is not radiating, lasted till he arrived to ER, on the way he received aspirin 325 mg and nitro, relieved the pain. He also noticed worsening MCKEON, exercise tolerance limited to 70 feet then he gets SOB. He is on lasix and he has been compliant on his meds, but still noticed increased weight about 7 lbs in 1 week. Patient reports last use of cocaine 1 month ago. He is from [place], was admitted about 1 month ago there with symptoms of acute decompensate heart failure, he had echo and was told his LVEF 25%, had left cardiac cath and said his coronary arteries are fine, didn't need any stenting. During his admission in [place] he was tested for COVID-19 and it was negative.  Denied any fever, nausea, vomiting, diarrhea, abdominal pain, blood in stool or black stool.  Denied no recent lightheadedness or syncope     CXR  Impression:     Mild-moderate pulmonary edema.      In ER:    Vitals /79   Pulse 105   Temp 98.9 °F (37.2 °C) (Oral)   Resp 14   Ht 6' 1\" (1.854 m)   Wt 200 lb (90.7 kg)   SpO2 96%   BMI 26.39 kg/m²   WBC, neutrophil %, neutrophil absolute count   Lab Results   Component Value Date    WBC 9.6 05/05/2020    NEUTOPHILPCT 55.7 05/05/2020    NEUTROABS 5.34 05/05/2020     Lactic acid No results found for: LACTSEPSIS  Cr   Lab Results   Component Value Date    CREATININE 1.4 (H) 05/05/2020       REVIEW OF SYSTEMS:  A comprehensive review of systems was negative except for: what is in the HPI    PMH:  Past Medical History:   Diagnosis well hydrated   Eyes: Pupils equal, round, and reactive to light  Neck: Supple and non tender without mass   Pulmonary/Chest: Clear to auscultation bilaterally- no wheezes, no crackle, not in respiratory distress  Cardiovascular: Normal rate, normal S1 and S2 and no carotid bruits  Abdomen: Soft, non-tender, non-distended, no rebound, no rigidity, + bowel sounds  Extremities: No cyanosis, no clubbing and no edema  Neurologic: No neurologic focal deficits, speech is normal, coherent     LABS:  CBC  Recent Labs     05/05/20 2223   WBC 9.6   HGB 13.3   HCT 38.9   MCV 87.4        BMP  Recent Labs     05/05/20 2223      K 4.1      CO2 24   BUN 21*   CREATININE 1.4*   LABGLOM >60   GLUCOSE 127*   CALCIUM 8.5*     No results found for: MG, PHOS  LFT  Recent Labs     05/05/20 2223   ALT 62*   AST 59*   ALKPHOS 87   BILITOT 0.6     Albumin  Lab Results   Component Value Date    LABALBU 4.0 05/05/2020     Lactic acid   No results for input(s): LACTSEPSIS in the last 72 hours. Cardiac  Troponin   Lab Results   Component Value Date    TROPONINI <0.01 05/06/2020    TROPONINI <0.01 05/05/2020     Pro-BNP   Lab Results   Component Value Date    PROBNP 3,661 (H) 05/05/2020     Iron studies  No results found for: FERRITIN, IRON, TIBC    EKG:  Sinus, T wave inversions in lateral leads, will repeat EKG in AM, no ST elevation        ASSESSMENT and PLAN:      Problem   Atypical Chest Pain   Heart Failure, Systolic, With Acute Decompensation ([facility])   Mic (Acute Kidney Injury) ([facility])   Cocaine Abuse ([facility])     Atypical chest pain   Acute decompensated Heart failure   Non ischemic cardiomyopathy   · Patient just had cardiac cath, no additional ischemic workup is required, his troponin normal x2, no further chest pain   · Lasix IV 40 mg x 1  · C.w lasix IV 20 BID  · C.w coreg  · C.  Lisinopril      MIC vs CKD  · This might be CKD, induced with diuretics, given he is in acute decompensated   · Will see his Cr in AM    Depression   · C.w Seroquel   · C.w bupropion     Code Status: Full   DVT prophylaxis: Lovenox   Diet: Cardiac       PLEASE NOTE:    This report was transcribed using typing and voice recognition software. Every effort was made to ensure accuracy; however, inadvertent computerized transcription errors may be present.  More than 50 minutes have been spent in evaluating the patient, reviewing records and results, discussing with staff / family and other treating physicians.     Jennifer Honeycutt MD, MSc   [facility]

## 2020-05-07 LAB
ALBUMIN SERPL-MCNC: 3.7 G/DL (ref 3.5–5.2)
ALP BLD-CCNC: 73 U/L (ref 40–129)
ALT SERPL-CCNC: 49 U/L (ref 0–40)
ANION GAP SERPL CALCULATED.3IONS-SCNC: 15 MMOL/L (ref 7–16)
AST SERPL-CCNC: 32 U/L (ref 0–39)
BASOPHILS ABSOLUTE: 0.06 E9/L (ref 0–0.2)
BASOPHILS RELATIVE PERCENT: 0.7 % (ref 0–2)
BILIRUB SERPL-MCNC: 0.8 MG/DL (ref 0–1.2)
BILIRUBIN DIRECT: <0.2 MG/DL (ref 0–0.3)
BILIRUBIN, INDIRECT: ABNORMAL MG/DL (ref 0–1)
BUN BLDV-MCNC: 18 MG/DL (ref 6–20)
CALCIUM SERPL-MCNC: 8.7 MG/DL (ref 8.6–10.2)
CHLORIDE BLD-SCNC: 98 MMOL/L (ref 98–107)
CO2: 26 MMOL/L (ref 22–29)
CREAT SERPL-MCNC: 1.4 MG/DL (ref 0.7–1.2)
EKG ATRIAL RATE: 106 BPM
EKG P AXIS: 69 DEGREES
EKG P-R INTERVAL: 140 MS
EKG Q-T INTERVAL: 382 MS
EKG QRS DURATION: 96 MS
EKG QTC CALCULATION (BAZETT): 507 MS
EKG R AXIS: -3 DEGREES
EKG T AXIS: 100 DEGREES
EKG VENTRICULAR RATE: 106 BPM
EOSINOPHILS ABSOLUTE: 0.76 E9/L (ref 0.05–0.5)
EOSINOPHILS RELATIVE PERCENT: 8.7 % (ref 0–6)
GFR AFRICAN AMERICAN: >60
GFR NON-AFRICAN AMERICAN: >60 ML/MIN/1.73
GLUCOSE BLD-MCNC: 137 MG/DL (ref 74–99)
HCT VFR BLD CALC: 41.2 % (ref 37–54)
HEMOGLOBIN: 13.6 G/DL (ref 12.5–16.5)
IMMATURE GRANULOCYTES #: 0.02 E9/L
IMMATURE GRANULOCYTES %: 0.2 % (ref 0–5)
LYMPHOCYTES ABSOLUTE: 1.58 E9/L (ref 1.5–4)
LYMPHOCYTES RELATIVE PERCENT: 18.1 % (ref 20–42)
MCH RBC QN AUTO: 28.9 PG (ref 26–35)
MCHC RBC AUTO-ENTMCNC: 33 % (ref 32–34.5)
MCV RBC AUTO: 87.5 FL (ref 80–99.9)
MONOCYTES ABSOLUTE: 1.07 E9/L (ref 0.1–0.95)
MONOCYTES RELATIVE PERCENT: 12.3 % (ref 2–12)
NEUTROPHILS ABSOLUTE: 5.24 E9/L (ref 1.8–7.3)
NEUTROPHILS RELATIVE PERCENT: 60 % (ref 43–80)
PDW BLD-RTO: 12.6 FL (ref 11.5–15)
PLATELET # BLD: 266 E9/L (ref 130–450)
PMV BLD AUTO: 10 FL (ref 7–12)
POTASSIUM REFLEX MAGNESIUM: 3.8 MMOL/L (ref 3.5–5)
RBC # BLD: 4.71 E12/L (ref 3.8–5.8)
SODIUM BLD-SCNC: 139 MMOL/L (ref 132–146)
TOTAL PROTEIN: 6.7 G/DL (ref 6.4–8.3)
WBC # BLD: 8.7 E9/L (ref 4.5–11.5)

## 2020-05-07 PROCEDURE — 6360000002 HC RX W HCPCS: Performed by: NURSE PRACTITIONER

## 2020-05-07 PROCEDURE — 6370000000 HC RX 637 (ALT 250 FOR IP): Performed by: INTERNAL MEDICINE

## 2020-05-07 PROCEDURE — 80076 HEPATIC FUNCTION PANEL: CPT

## 2020-05-07 PROCEDURE — 1200000000 HC SEMI PRIVATE

## 2020-05-07 PROCEDURE — APPSS30 APP SPLIT SHARED TIME 16-30 MINUTES: Performed by: PHYSICIAN ASSISTANT

## 2020-05-07 PROCEDURE — 2580000003 HC RX 258: Performed by: NURSE PRACTITIONER

## 2020-05-07 PROCEDURE — 2580000003 HC RX 258: Performed by: INTERNAL MEDICINE

## 2020-05-07 PROCEDURE — 6360000002 HC RX W HCPCS: Performed by: INTERNAL MEDICINE

## 2020-05-07 PROCEDURE — 80048 BASIC METABOLIC PNL TOTAL CA: CPT

## 2020-05-07 PROCEDURE — 93005 ELECTROCARDIOGRAM TRACING: CPT | Performed by: NURSE PRACTITIONER

## 2020-05-07 PROCEDURE — 99233 SBSQ HOSP IP/OBS HIGH 50: CPT | Performed by: NURSE PRACTITIONER

## 2020-05-07 PROCEDURE — 36415 COLL VENOUS BLD VENIPUNCTURE: CPT

## 2020-05-07 PROCEDURE — 99232 SBSQ HOSP IP/OBS MODERATE 35: CPT | Performed by: INTERNAL MEDICINE

## 2020-05-07 PROCEDURE — 85025 COMPLETE CBC W/AUTO DIFF WBC: CPT

## 2020-05-07 PROCEDURE — 2700000000 HC OXYGEN THERAPY PER DAY

## 2020-05-07 RX ORDER — FUROSEMIDE 10 MG/ML
20 INJECTION INTRAMUSCULAR; INTRAVENOUS 2 TIMES DAILY
Status: COMPLETED | OUTPATIENT
Start: 2020-05-07 | End: 2020-05-07

## 2020-05-07 RX ORDER — METOPROLOL SUCCINATE 25 MG/1
25 TABLET, EXTENDED RELEASE ORAL DAILY
Status: DISCONTINUED | OUTPATIENT
Start: 2020-05-07 | End: 2020-05-08 | Stop reason: HOSPADM

## 2020-05-07 RX ORDER — FUROSEMIDE 40 MG/1
40 TABLET ORAL DAILY
Status: DISCONTINUED | OUTPATIENT
Start: 2020-05-08 | End: 2020-05-08 | Stop reason: HOSPADM

## 2020-05-07 RX ADMIN — FUROSEMIDE 20 MG: 10 INJECTION, SOLUTION INTRAVENOUS at 18:26

## 2020-05-07 RX ADMIN — BUPROPION HYDROCHLORIDE 150 MG: 150 TABLET, FILM COATED, EXTENDED RELEASE ORAL at 12:50

## 2020-05-07 RX ADMIN — AMPICILLIN SODIUM AND SULBACTAM SODIUM 3 G: 2; 1 INJECTION, POWDER, FOR SOLUTION INTRAMUSCULAR; INTRAVENOUS at 18:26

## 2020-05-07 RX ADMIN — LISINOPRIL 5 MG: 5 TABLET ORAL at 09:44

## 2020-05-07 RX ADMIN — SODIUM CHLORIDE, PRESERVATIVE FREE 10 ML: 5 INJECTION INTRAVENOUS at 09:44

## 2020-05-07 RX ADMIN — SODIUM CHLORIDE, PRESERVATIVE FREE 10 ML: 5 INJECTION INTRAVENOUS at 12:50

## 2020-05-07 RX ADMIN — FUROSEMIDE 20 MG: 10 INJECTION, SOLUTION INTRAVENOUS at 09:44

## 2020-05-07 RX ADMIN — CARVEDILOL 3.12 MG: 3.12 TABLET, FILM COATED ORAL at 09:44

## 2020-05-07 RX ADMIN — SPIRONOLACTONE 25 MG: 25 TABLET ORAL at 09:44

## 2020-05-07 RX ADMIN — AMPICILLIN SODIUM AND SULBACTAM SODIUM 3 G: 2; 1 INJECTION, POWDER, FOR SOLUTION INTRAMUSCULAR; INTRAVENOUS at 05:42

## 2020-05-07 RX ADMIN — AMPICILLIN SODIUM AND SULBACTAM SODIUM 3 G: 2; 1 INJECTION, POWDER, FOR SOLUTION INTRAMUSCULAR; INTRAVENOUS at 12:50

## 2020-05-07 RX ADMIN — METOPROLOL SUCCINATE 25 MG: 25 TABLET, EXTENDED RELEASE ORAL at 16:43

## 2020-05-07 RX ADMIN — SODIUM CHLORIDE, PRESERVATIVE FREE 10 ML: 5 INJECTION INTRAVENOUS at 20:39

## 2020-05-07 RX ADMIN — QUETIAPINE FUMARATE 100 MG: 100 TABLET ORAL at 20:38

## 2020-05-07 ASSESSMENT — PAIN SCALES - GENERAL
PAINLEVEL_OUTOF10: 0

## 2020-05-07 NOTE — CARE COORDINATION
5/7/2020 1220 CM note: Jaxon Rodrigues rep, notified of life vest orders.  Chelsy Jones to get insurance approval. Isma FELIX

## 2020-05-07 NOTE — PLAN OF CARE
Problem: Falls - Risk of:  Goal: Will remain free from falls  Description: Will remain free from falls  5/6/2020 2239 by Ashlee Fatima RN  Outcome: Met This Shift     Problem: Falls - Risk of:  Goal: Absence of physical injury  Description: Absence of physical injury  5/6/2020 2239 by Ashlee Fatima RN  Outcome: Met This Shift     Problem: Pain:  Goal: Pain level will decrease  Description: Pain level will decrease  5/6/2020 2239 by Ashlee Fatima RN  Outcome: Met This Shift     Problem: Pain:  Goal: Control of acute pain  Description: Control of acute pain  5/6/2020 2239 by Ashlee Fatima RN  Outcome: Met This Shift     Problem: Pain:  Goal: Control of chronic pain  Description: Control of chronic pain  5/6/2020 2239 by Ashlee Fatima RN  Outcome: Met This Shift

## 2020-05-07 NOTE — PROGRESS NOTES
[facility] Quality Flow/Interdisciplinary Rounds Progress Note        Quality Flow Rounds held on May 7, 2020    Disciplines Attending:  Bedside Nurse, ,  and Nursing Unit Leadership    Last Mercedes was admitted on 5/5/2020 10:09 PM    Anticipated Discharge Date:  Expected Discharge Date: 05/08/20    Disposition:    Jonah Score:  Jonah Scale Score: 23    Readmission Risk              Risk of Unplanned Readmission:        16           Discussed patient goal for the day, patient clinical progression, and barriers to discharge.   The following Goal(s) of the Day/Commitment(s) have been identified:  ATX, RA, 1200 FR, activity progression      Kaleigh Zayas  May 7, 2020

## 2020-05-07 NOTE — PROGRESS NOTES
[address] [place], [place] [address]ist   Progress Note    Admitting Date and Time: 5/5/2020 10:09 PM  Admit Dx: Atypical chest pain [R07.89]  Acute on chronic systolic and diastolic heart failure, NYHA class 1 (HCC) [I50.43]    Subjective:    Patient reports shortness of breath is improving. He continues to have dental pain. ROS: denies fever, chills, cp, sob, n/v, HA unless stated above.      buPROPion  150 mg Oral QAM    carvedilol  3.125 mg Oral BID WC    lisinopril  5 mg Oral Daily    QUEtiapine  100 mg Oral Nightly    sodium chloride flush  10 mL Intravenous 2 times per day    enoxaparin  40 mg Subcutaneous Daily    nicotine  1 patch Transdermal Daily    spironolactone  25 mg Oral Daily    furosemide  20 mg Intravenous BID    ampicillin-sulbactam  3 g Intravenous Q6H     sodium chloride flush, 10 mL, PRN  potassium chloride, 40 mEq, PRN    Or  potassium alternative oral replacement, 40 mEq, PRN    Or  potassium chloride, 10 mEq, PRN  magnesium sulfate, 1 g, PRN  acetaminophen, 650 mg, Q6H PRN    Or  acetaminophen, 650 mg, Q6H PRN  magnesium hydroxide, 30 mL, Daily PRN         Objective:    BP (!) 129/97   Pulse 57   Temp 96.6 °F (35.9 °C) (Axillary)   Resp 22   Ht 6' 1\" (1.854 m)   Wt 201 lb 4.8 oz (91.3 kg)   SpO2 100%   BMI 26.56 kg/m²   General Appearance: alert and oriented to person, place and time and in no acute distress  Skin: warm and dry  Head: normocephalic and atraumatic  Eyes: pupils equal, round, and reactive to light, extraocular eye movements intact, conjunctivae normal  Mouth:  Cracked and abscessed right molar  Neck: neck supple, right sided tenderness with enlarged lymph node, + JVD   Pulmonary/Chest: diminished bilateral bases, no respiratory distress  Cardiovascular: normal rate, normal S1 and S2  Abdomen: soft, non-tender, non-distended, normal bowel sounds, no masses or organomegaly  Extremities: no cyanosis, no clubbing and no edema  Neurologic: no tremor and speech normal      Recent Labs     05/05/20 2223 05/06/20 0517 05/07/20  0632    136 139   K 4.1 3.6 3.8    100 98   CO2 24 24 26   BUN 21* 20 18   CREATININE 1.4* 1.4* 1.4*   GLUCOSE 127* 110* 137*   CALCIUM 8.5* 8.8 8.7       Recent Labs     05/05/20 2223 05/06/20 0517 05/07/20  0632   ALKPHOS 87 87 73   PROT 7.4 7.0 6.7   LABALBU 4.0 3.9 3.7   BILITOT 0.6 0.6 0.8   AST 59* 39 32   ALT 62* 56* 49*       Recent Labs     05/05/20 2223 05/07/20  0632   WBC 9.6 8.7   RBC 4.45 4.71   HGB 13.3 13.6   HCT 38.9 41.2   MCV 87.4 87.5   MCH 29.9 28.9   MCHC 34.2 33.0   RDW 12.6 12.6    266   MPV 9.6 10.0         Radiology:   XR CHEST PORTABLE   Final Result   Mild-moderate pulmonary edema. Assessment:  Principal Problem:    Heart failure, systolic, with acute decompensation (HCC)  Active Problems:    Atypical chest pain    ASHLEY (acute kidney injury) ([facility]    Cocaine abuse ([facility]    Acute on chronic systolic and diastolic heart failure, NYHA class 1 ([facility]    Abscessed tooth  Resolved Problems:    * No resolved hospital problems. *      Plan:  1. Acute on chronic systolic and diastolic heart failure: Echo on 4/6/20 at [facility] revealed an EF of 25% and grade 2 diastolic dysfunction. Cardiology consulted. Patient agreeable to Life Vest. Continue Coreg, lisinopril and Aldactone. Continue IV Lasix - monitor creatinine closely. Patient is negative 2.9 liters. 2. Chest pain:  Troponin negative x 2.   3. Acute kidney injury:  Creatinine was 1.4 on admission. During his admission to [place] [address], his creatinine was 1.15 on admission, peaked to 1.46 and was 1.37 at discharge. Monitor kidney function and consider Nephrology consult if creatinine continues to increase. Creatinine remains at 1.4 today. 4. Abscessed tooth:  Patient has a cracked, abscessed tooth and enlarged lymph node. He is on his second round of antibiotics.   He reports

## 2020-05-07 NOTE — CARE COORDINATION
Ss note:5/7/2020.3:57 PM Received call from Admissions director Belle at [facility], intensive inpt rehab program [phone]. Liana Doctor reports that due to pts medical issues/concerns this facility CANNOT accept pt back. Ashleigh contact Carrie Yeboah with [facility] to meet with pt to determine if there is another inpt program pt can attend. Ashleigh spoke with pt, encouraged pt to consider another inpt rehab. Pt agreeable to speak with Carrie Yeboah for possible options however pt may wish to discharge back home to [place]. Pt does not have transportation back home. Carrie Yeboah made contact with [facility], information faxed, will await review for acceptance and encourage pt to attend if a bed is available. ASHLEIGH will follow up tomorrow for final discharge plan.  VIKTOR Neal

## 2020-05-07 NOTE — PROGRESS NOTES
Deferred  SKIN: Warm and dry no statis dermatitis or ulcers   NEURO / PSYCH: Oriented to person, place and time. Speech clear and appropriate. Follows all commands. Pleasant affect       Intake/Output Summary (Last 24 hours) at 5/7/2020 1146  Last data filed at 5/7/2020 2652  Gross per 24 hour   Intake 960 ml   Output 3080 ml   Net -2120 ml       Weight:   Wt Readings from Last 3 Encounters:   05/06/20 201 lb 4.8 oz (91.3 kg)     Current Inpatient Medications:   buPROPion  150 mg Oral QAM    carvedilol  3.125 mg Oral BID WC    lisinopril  5 mg Oral Daily    QUEtiapine  100 mg Oral Nightly    sodium chloride flush  10 mL Intravenous 2 times per day    enoxaparin  40 mg Subcutaneous Daily    nicotine  1 patch Transdermal Daily    spironolactone  25 mg Oral Daily    furosemide  20 mg Intravenous BID    ampicillin-sulbactam  3 g Intravenous Q6H       IV Infusions (if any):   sodium chloride         DIAGNOSTIC/ LABORATORY DATA:  Labs:   CBC:   Recent Labs     05/05/20 2223 05/07/20  0632   WBC 9.6 8.7   HGB 13.3 13.6   HCT 38.9 41.2    266     BMP:   Recent Labs     05/06/20 0517 05/07/20  0632    139   K 3.6 3.8   CO2 24 26   BUN 20 18   CREATININE 1.4* 1.4*   LABGLOM >60 >60   CALCIUM 8.8 8.7     Mag:   Recent Labs     05/06/20  0517   MG 2.1     Phos: No results for input(s): PHOS in the last 72 hours. TSH: No results for input(s): TSH in the last 72 hours. HgA1c: No results found for: LABA1C  No results found for: EAG    BNP: No results for input(s): BNP in the last 72 hours. PT/INR:   Recent Labs     05/05/20 2223   PROTIME 12.5*   INR 1.1     APTT:No results for input(s): APTT in the last 72 hours.   CARDIAC ENZYMES:  Recent Labs     05/05/20 2223 05/06/20  0102   TROPONINI <0.01 <0.01     FASTING LIPID PANEL:No results found for: CHOL, HDL, LDLDIRECT, LDLCALC, TRIG  LIVER PROFILE:  Recent Labs     05/06/20 0517 05/07/20  0632   AST 39 32   ALT 56* 49*   LABALBU 3.9 3.7

## 2020-05-07 NOTE — PLAN OF CARE
Problem: Falls - Risk of:  Goal: Will remain free from falls  Description: Will remain free from falls  5/7/2020 0409 by Jen Esparza RN  Outcome: Met This Shift     Problem: Pain:  Description: Pain management should include both nonpharmacologic and pharmacologic interventions.   Goal: Pain level will decrease  Description: Pain level will decrease  5/7/2020 0409 by Jen Esparza RN  Outcome: Met This Shift

## 2020-05-07 NOTE — PROGRESS NOTES
Extremities:   No clubbing, no cyanosis, no edema. CNS    AAxO   Lines: piv    Radiology:  Laboratory and Tests Review:  Lab Results   Component Value Date    WBC 8.7 05/07/2020    WBC 9.6 05/05/2020    HGB 13.6 05/07/2020    HCT 41.2 05/07/2020    MCV 87.5 05/07/2020     05/07/2020     No results found for: CRP  Lab Results   Component Value Date    ALT 49 (H) 05/07/2020    AST 32 05/07/2020    ALKPHOS 73 05/07/2020    BILITOT 0.8 05/07/2020     Lab Results   Component Value Date     05/07/2020    K 3.8 05/07/2020    CL 98 05/07/2020    CO2 26 05/07/2020    BUN 18 05/07/2020    CREATININE 1.4 05/07/2020    CREATININE 1.4 05/06/2020    CREATININE 1.4 05/05/2020    GFRAA >60 05/07/2020    LABGLOM >60 05/07/2020    GLUCOSE 137 05/07/2020    PROT 6.7 05/07/2020    LABALBU 3.7 05/07/2020    CALCIUM 8.7 05/07/2020    BILITOT 0.8 05/07/2020    ALKPHOS 73 05/07/2020    AST 32 05/07/2020    ALT 49 05/07/2020     No results found for: CRP  Lab Results   Component Value Date    SEDRATE 1 05/06/2020       Microbiology:      Recent Labs     05/06/20  1030   LABURIN Growth not present, incubation continues         ASSESSMENT/PLAN:    Pt is in drug rehab  Pt was just in [facility] early 4/2020  Pulmonary nodule/heart failure/cocaine abuse-dilated CMP  S/p admission rhinovirus/tb neg  Has had neg hiv/hep c/covid 19 screen  He has sob/dental pain right side with facial swelling failed oral clinda/pcn  Admitted for dental infection      Cont unasyn can prob change to po on d/c  Will see dental next week for further eval   mrsa screen can prob stop vanco   Check other cx     ampicillin-sulbactam (UNASYN) 3 g ivpb minibag, Q6H        · Monitor labs    Imaging and labs were reviewed per medical records. The patient was educated about the diagnosis, prognosis, indications, risks and benefits of treatment. An opportunity to ask questions was given to the patient/FAMILY.       Electronically signed by Pino Medina MD on 5/7/2020 at 11:15 AM

## 2020-05-08 VITALS
HEIGHT: 73 IN | BODY MASS INDEX: 25.75 KG/M2 | OXYGEN SATURATION: 98 % | HEART RATE: 100 BPM | SYSTOLIC BLOOD PRESSURE: 117 MMHG | TEMPERATURE: 98.1 F | WEIGHT: 194.3 LBS | RESPIRATION RATE: 18 BRPM | DIASTOLIC BLOOD PRESSURE: 80 MMHG

## 2020-05-08 PROBLEM — R07.9 CHEST PAIN: Status: ACTIVE | Noted: 2020-05-06

## 2020-05-08 LAB
ALBUMIN SERPL-MCNC: 3.7 G/DL (ref 3.5–5.2)
ALP BLD-CCNC: 83 U/L (ref 40–129)
ALT SERPL-CCNC: 45 U/L (ref 0–40)
ANION GAP SERPL CALCULATED.3IONS-SCNC: 13 MMOL/L (ref 7–16)
AST SERPL-CCNC: 30 U/L (ref 0–39)
BASOPHILS ABSOLUTE: 0.06 E9/L (ref 0–0.2)
BASOPHILS RELATIVE PERCENT: 0.7 % (ref 0–2)
BILIRUB SERPL-MCNC: 0.6 MG/DL (ref 0–1.2)
BILIRUBIN DIRECT: 0.2 MG/DL (ref 0–0.3)
BILIRUBIN, INDIRECT: 0.4 MG/DL (ref 0–1)
BUN BLDV-MCNC: 16 MG/DL (ref 6–20)
CALCIUM SERPL-MCNC: 9 MG/DL (ref 8.6–10.2)
CHLORIDE BLD-SCNC: 99 MMOL/L (ref 98–107)
CO2: 25 MMOL/L (ref 22–29)
CREAT SERPL-MCNC: 1.4 MG/DL (ref 0.7–1.2)
EOSINOPHILS ABSOLUTE: 0.8 E9/L (ref 0.05–0.5)
EOSINOPHILS RELATIVE PERCENT: 8.9 % (ref 0–6)
GFR AFRICAN AMERICAN: >60
GFR NON-AFRICAN AMERICAN: >60 ML/MIN/1.73
GLUCOSE BLD-MCNC: 122 MG/DL (ref 74–99)
HCT VFR BLD CALC: 42.7 % (ref 37–54)
HEMOGLOBIN: 14.2 G/DL (ref 12.5–16.5)
IMMATURE GRANULOCYTES #: 0.02 E9/L
IMMATURE GRANULOCYTES %: 0.2 % (ref 0–5)
LYMPHOCYTES ABSOLUTE: 1.85 E9/L (ref 1.5–4)
LYMPHOCYTES RELATIVE PERCENT: 20.7 % (ref 20–42)
MCH RBC QN AUTO: 29.3 PG (ref 26–35)
MCHC RBC AUTO-ENTMCNC: 33.3 % (ref 32–34.5)
MCV RBC AUTO: 88.2 FL (ref 80–99.9)
MONOCYTES ABSOLUTE: 1.26 E9/L (ref 0.1–0.95)
MONOCYTES RELATIVE PERCENT: 14.1 % (ref 2–12)
MRSA CULTURE ONLY: NORMAL
NEUTROPHILS ABSOLUTE: 4.95 E9/L (ref 1.8–7.3)
NEUTROPHILS RELATIVE PERCENT: 55.4 % (ref 43–80)
PDW BLD-RTO: 12.4 FL (ref 11.5–15)
PLATELET # BLD: 292 E9/L (ref 130–450)
PMV BLD AUTO: 9.9 FL (ref 7–12)
POTASSIUM REFLEX MAGNESIUM: 3.6 MMOL/L (ref 3.5–5)
RBC # BLD: 4.84 E12/L (ref 3.8–5.8)
SODIUM BLD-SCNC: 137 MMOL/L (ref 132–146)
TOTAL PROTEIN: 7.3 G/DL (ref 6.4–8.3)
URINE CULTURE, ROUTINE: NORMAL
WBC # BLD: 8.9 E9/L (ref 4.5–11.5)

## 2020-05-08 PROCEDURE — 99239 HOSP IP/OBS DSCHRG MGMT >30: CPT | Performed by: INTERNAL MEDICINE

## 2020-05-08 PROCEDURE — 85025 COMPLETE CBC W/AUTO DIFF WBC: CPT

## 2020-05-08 PROCEDURE — 36415 COLL VENOUS BLD VENIPUNCTURE: CPT

## 2020-05-08 PROCEDURE — 2580000003 HC RX 258: Performed by: INTERNAL MEDICINE

## 2020-05-08 PROCEDURE — 99231 SBSQ HOSP IP/OBS SF/LOW 25: CPT | Performed by: INTERNAL MEDICINE

## 2020-05-08 PROCEDURE — 2580000003 HC RX 258: Performed by: NURSE PRACTITIONER

## 2020-05-08 PROCEDURE — 6370000000 HC RX 637 (ALT 250 FOR IP): Performed by: INTERNAL MEDICINE

## 2020-05-08 PROCEDURE — 80048 BASIC METABOLIC PNL TOTAL CA: CPT

## 2020-05-08 PROCEDURE — 80076 HEPATIC FUNCTION PANEL: CPT

## 2020-05-08 PROCEDURE — 6360000002 HC RX W HCPCS: Performed by: NURSE PRACTITIONER

## 2020-05-08 PROCEDURE — APPSS45 APP SPLIT SHARED TIME 31-45 MINUTES: Performed by: NURSE PRACTITIONER

## 2020-05-08 PROCEDURE — APPSS30 APP SPLIT SHARED TIME 16-30 MINUTES: Performed by: PHYSICIAN ASSISTANT

## 2020-05-08 RX ORDER — LISINOPRIL 10 MG/1
10 TABLET ORAL DAILY
Qty: 30 TABLET | Refills: 0 | Status: SHIPPED | OUTPATIENT
Start: 2020-05-09

## 2020-05-08 RX ORDER — AMOXICILLIN AND CLAVULANATE POTASSIUM 875; 125 MG/1; MG/1
1 TABLET, FILM COATED ORAL EVERY 12 HOURS SCHEDULED
Qty: 28 TABLET | Refills: 0 | Status: SHIPPED | OUTPATIENT
Start: 2020-05-08 | End: 2020-05-22

## 2020-05-08 RX ORDER — LISINOPRIL 10 MG/1
10 TABLET ORAL DAILY
Status: DISCONTINUED | OUTPATIENT
Start: 2020-05-09 | End: 2020-05-08 | Stop reason: HOSPADM

## 2020-05-08 RX ORDER — METOPROLOL SUCCINATE 25 MG/1
25 TABLET, EXTENDED RELEASE ORAL DAILY
Qty: 30 TABLET | Refills: 0 | Status: SHIPPED | OUTPATIENT
Start: 2020-05-09

## 2020-05-08 RX ORDER — AMOXICILLIN AND CLAVULANATE POTASSIUM 875; 125 MG/1; MG/1
1 TABLET, FILM COATED ORAL EVERY 12 HOURS SCHEDULED
Status: DISCONTINUED | OUTPATIENT
Start: 2020-05-08 | End: 2020-05-08 | Stop reason: HOSPADM

## 2020-05-08 RX ORDER — SPIRONOLACTONE 25 MG/1
25 TABLET ORAL DAILY
Qty: 30 TABLET | Refills: 0 | Status: SHIPPED | OUTPATIENT
Start: 2020-05-09

## 2020-05-08 RX ADMIN — AMPICILLIN SODIUM AND SULBACTAM SODIUM 3 G: 2; 1 INJECTION, POWDER, FOR SOLUTION INTRAMUSCULAR; INTRAVENOUS at 00:13

## 2020-05-08 RX ADMIN — METOPROLOL SUCCINATE 25 MG: 25 TABLET, EXTENDED RELEASE ORAL at 08:06

## 2020-05-08 RX ADMIN — SPIRONOLACTONE 25 MG: 25 TABLET ORAL at 08:06

## 2020-05-08 RX ADMIN — AMPICILLIN SODIUM AND SULBACTAM SODIUM 3 G: 2; 1 INJECTION, POWDER, FOR SOLUTION INTRAMUSCULAR; INTRAVENOUS at 05:50

## 2020-05-08 RX ADMIN — SODIUM CHLORIDE, PRESERVATIVE FREE 10 ML: 5 INJECTION INTRAVENOUS at 08:06

## 2020-05-08 RX ADMIN — BUPROPION HYDROCHLORIDE 150 MG: 150 TABLET, FILM COATED, EXTENDED RELEASE ORAL at 08:06

## 2020-05-08 RX ADMIN — LISINOPRIL 5 MG: 5 TABLET ORAL at 08:06

## 2020-05-08 RX ADMIN — FUROSEMIDE 40 MG: 40 TABLET ORAL at 08:06

## 2020-05-08 RX ADMIN — AMPICILLIN SODIUM AND SULBACTAM SODIUM 3 G: 2; 1 INJECTION, POWDER, FOR SOLUTION INTRAMUSCULAR; INTRAVENOUS at 11:24

## 2020-05-08 ASSESSMENT — PAIN SCALES - GENERAL
PAINLEVEL_OUTOF10: 0
PAINLEVEL_OUTOF10: 0

## 2020-05-08 NOTE — PROGRESS NOTES
[facility] Quality Flow/Interdisciplinary Rounds Progress Note        Quality Flow Rounds held on May 8, 2020    Disciplines Attending:  Bedside Nurse, ,  and Nursing Unit Leadership    Grecia Causey was admitted on 5/5/2020 10:09 PM    Anticipated Discharge Date:  Expected Discharge Date: 05/08/20    Disposition:    Jonah Score:  Jonah Scale Score: 23    Readmission Risk              Risk of Unplanned Readmission:        17           Discussed patient goal for the day, patient clinical progression, and barriers to discharge.   The following Goal(s) of the Day/Commitment(s) have been identified:  Activity progression, lives in [facility]  May 8, 2020

## 2020-05-08 NOTE — CARE COORDINATION
Ss note:5/8/2020.12:52 PM Per NP Pierce Acevedo, anticipate discharge home today. SW contacted [facility] transportation line at [phone] for private car transport to take pt to home address in [place]. There is a 3 hour window for transportation time, awaiting return call for confirmation time of estimated . ASHLEIGH notified charge nurse. auth # [id number].  VIKTOR Mauricio

## 2020-05-08 NOTE — PROGRESS NOTES
Component Value Date    WBC 8.9 05/08/2020    WBC 8.7 05/07/2020    WBC 9.6 05/05/2020    HGB 14.2 05/08/2020    HCT 42.7 05/08/2020    MCV 88.2 05/08/2020     05/08/2020     No results found for: CRPHS  Lab Results   Component Value Date    ALT 45 (H) 05/08/2020    AST 30 05/08/2020    ALKPHOS 83 05/08/2020    BILITOT 0.6 05/08/2020     Lab Results   Component Value Date     05/08/2020    K 3.6 05/08/2020    CL 99 05/08/2020    CO2 25 05/08/2020    BUN 16 05/08/2020    CREATININE 1.4 05/08/2020    CREATININE 1.4 05/07/2020    CREATININE 1.4 05/06/2020    GFRAA >60 05/08/2020    LABGLOM >60 05/08/2020    GLUCOSE 122 05/08/2020    PROT 7.3 05/08/2020    LABALBU 3.7 05/08/2020    CALCIUM 9.0 05/08/2020    BILITOT 0.6 05/08/2020    ALKPHOS 83 05/08/2020    AST 30 05/08/2020    ALT 45 05/08/2020     No results found for: CRP  Lab Results   Component Value Date    SEDRATE 1 05/06/2020       Microbiology:      Recent Labs     05/06/20 [address] not present         ASSESSMENT/PLAN:    Pt is in drug rehab  Pt was just in [facility] early 4/2020  Pulmonary nodule/heart failure/cocaine abuse-dilated CMP has life vest  S/p admission rhinovirus/tb neg  Has had neg hiv/hep c/covid 19 screen  He has sob/dental pain right side with facial swelling failed oral clinda/pcn  Admitted for dental infection      On unasyn  change to po augmentin  Pt Will see dental next week  In [place] screen neg        ampicillin-sulbactam (UNASYN) 3 g ivpb minibag, Q6H    Change to po augmentin     · Monitor labs    Imaging and labs were reviewed per medical records. The patient was educated about the diagnosis, prognosis, indications, risks and benefits of treatment. An opportunity to ask questions was given to the patient/FAMILY.       Electronically signed by Judith Pemberton MD on 5/8/2020 at 11:44 AM

## 2020-05-08 NOTE — PROGRESS NOTES
Inpatient Cardiology Progress note     PATIENT IS BEING FOLLOWED FOR: HFrEF, NICM, VHD    April  is a 45 y.o. male  Seen in initial consultation with Dr. Alfonzo Thapa  5/6/2020. He resides in [address] [place] [address] and follows with cardiology in [address] [place] [address] per patient. SUBJECTIVE: Ambulating in velarde prior to visit. Resting comfortably in bed. Admits to chronic MCKEON. Denies any chest pain or palpitations. Denies any current leg edema. OBJECTIVE: No apparent distress, resting in bed      ROS:  Consist: Denies fevers, chills or night sweats  Heart: Denies chest pain, palpitations, lightheadedness, dizziness or syncope  Lungs:+ MCKEON, denies cough, wheezing, orthopnea or PND  GI: Denies abdominal pain, vomiting or diarrhea    PHYSICAL EXAM:   BP (!) 184/68   Pulse 98   Temp 98.1 °F (36.7 °C) (Oral)   Resp 18   Ht 6' 1\" (1.854 m)   Wt 194 lb 4.8 oz (88.1 kg)   SpO2 98%   BMI 25.63 kg/m²      CONST: Well developed, well nourished male who appears his stated age. Awake, alert and cooperative. No apparent distress  HEENT:   Head- Normocephalic, atraumatic   Eyes- Conjunctivae pink, anicteric  Throat- Oral mucosa pink and moist  Neck-  No stridor, trachea midline, no jugular venous distention. CHEST: Chest symmetrical and non-tender to palpation. No accessory muscle use or intercostal retractions - not currently wearing Lifevest.   RESPIRATORY:  Diminished breath sounds bilaterally. CARDIOVASCULAR:     Heart Ausculation- Regular rate and rhythm, no murmur. PV: No lower extremity edema. No varicosities. ABDOMEN: Soft, non-tender to light palpation. Bowel sounds present. MS: No atrophy or abnormal movements. : Deferred  SKIN: Warm and dry no statis dermatitis or ulcers   NEURO / PSYCH: Oriented to person, place and time. Speech clear and appropriate. Follows all commands.  Pleasant affect       Intake/Output Summary (Last 24 hours) at 5/8/2020 0947  Last data filed at 5/8/2020 0584  Gross per 24 hour   Intake LVEDP.    ASSESSMENT:   1. Nonischemic Cardiomyopathy with patent coronary arteries on cath 04/20/2020, EF 25% per echo 04/06/2020. On BB, ACE, Aldactone. 2. HFrEF, decompensated. - 5262 fluid balance since admission. Lasix PO daily. 3. Atypical chest pain on admission, negative Troponin x 2 with no acute EKG changes. 4. Abcess tooth, on IV antibiotics. 5. HTN, controlled  6. Hypertriglyceridemia  7. Current tobacco abuse  8. Crack cocaine use, currently enrolled in rehab setting  9. Depression/Bipolar Disorder/Hx of suicidal ideation; on Seroquel QTc 507 on EKG 5/7/2020    10. Oral pain with complaints of cracked tooth with drainage (on second antibiotic as outpatient)  11. Sinus Tachycardia (HR 90-110s)  12. Questionable ASHLEY with BUN/SCr 18/1.4 --> 16/1.4       PLAN:  1. Would increase Toprol XL, Lisinopril, Aldactone to maximize medical therapy as BP allows  - will defer to Primary Cardiologist in [place]    2. Continue PO Lasix   3. Patient to be discharged home today - will have someone at home with him. He is in contact with his Cardiologist in [place] to get an earlier appointment in the office than his regularly scheduled visit on 6/1/2020.   4. Life vest was ordered for patient, he verbalizes his understanding. Case discussed with Dr Alfonzo Thapa, further recommendations to follow. Electronically signed by Norbert Collado on 5/8/2020 at 9:42 AM         [facility] progress note  Karen Lozano     I have personally participated in a face-to-face and personally obtained history and performed physical exam on the date of service. I reviewed chart, vitals, labs and radiologic studies. I also participated in medical decision making with [address] [place] [address] the date of service All of the assessments and recommendations are from me and I agree with all of the pertinent clinical information, assessment and treatment plan.  I have reviewed and edited the note above based on my findings during my history, exam, and decision making. Please see my additional contributions to the history, physical exam, assessment, and recommendations below. Patient is seen in follow-up for CHF    Subjective:     Mr. Max Parents feels little bit better today, shortness of breath is improving  Sitting up in bed no apparent distress    ROS:  CONSTITUTIONAL:  negative for  fevers, chills  HEENT:  negative for earaches, nasal congestion and epistaxis  RESPIRATORY:  negative for  dry cough, cough with sputum,wheezing and hemoptysis  GASTROINTESTINAL:  negative for nausea, vomiting  MUSCULOSKELETAL:  negative for  myalgias, arthralgias  NEUROLOGICAL:  negative for visual disturbance, dysphagia    Medication side effects: none    Scheduled Meds:    Continuous Infusions:    PRN Meds:      Objective:      Physical Exam:   /80   Pulse 100   Temp 98.1 °F (36.7 °C) (Oral)   Resp 18   Ht 6' 1\" (1.854 m)   Wt 194 lb 4.8 oz (88.1 kg)   SpO2 98%   BMI 25.63 kg/m²   CONSTITUTIONAL:  awake, alert, cooperative, no apparent distress, and appears stated age  HEAD:  normocepalic, without obvious abnormality, atraumatic  NECK:  Supple, symmetrical, trachea midline, no adenopathy, thyroid symmetric, not enlarged and no tenderness, skin normal  LUNGS:  No increased work of breathing, good air exchange, clear to auscultation bilaterally, no crackles or wheezing  CARDIOVASCULAR: Laterally displaced PMI, regular rate and rhythm, normal S1 and S2, no S3 or S4, 3 out of 6 systolic murmur at the apex, 2 out of 6 systolic murmur at the left lower sternal border, 2 out of 6 diastolic murmur at the right upper sternal border, no JVD, no carotid bruit, no pedal edema, good carotid upstroke bilaterally. ABDOMEN:  Soft, nontender, no masses, no hepatomegaly, no splenomegaly, BS+  MUSCULOSKELETAL:  No clubbing no cyanosis. there is no redness, warmth, or swelling of the joints  full range of motion noted  NEUROLOGIC:  Alert, awake,oriented x3  SKIN:

## 2020-05-08 NOTE — DISCHARGE SUMMARY
[facility] Physician Discharge Summary       Laura Weir  [address]  [address]  [phone]      June 1st      Danika Arce  [address] [place] [address]   [phone] (Fax)  May 29         Activity level: Activity as tolerated    Diet: DIET GENERAL; Low Sodium (2 GM); Daily Fluid Restriction: 2000 ml    Labs:CBC and BMP in one week    Condition at discharge: Stable    Dispo:Discharge to home      Patient ID:  Scott Ferreira  [id number]  45 y.o.  1970    Admit date: 5/5/2020    Discharge date and time:  5/8/2020  1:32 PM    Admission Diagnoses: Principal Problem:    Heart failure, systolic, with acute decompensation ([address]  Active Problems:    Atypical chest pain    ASHLEY (acute kidney injury) ([address]    Cocaine abuse ([address]    Acute on chronic systolic and diastolic heart failure, NYHA class 1 (HCC)    Abscessed tooth    Nonrheumatic mitral valve regurgitation    Nonrheumatic tricuspid valve regurgitation    Pulmonary HTN ([address]    Nonrheumatic aortic valve insufficiency  Resolved Problems:    * No resolved hospital problems. *      Discharge Diagnoses: Principal Problem:    Heart failure, systolic, with acute decompensation (HCC)  Active Problems:    Atypical chest pain    ASHLEY (acute kidney injury) ([address]    Cocaine abuse ([address]    Acute on chronic systolic and diastolic heart failure, NYHA class 1 (HCC)    Abscessed tooth    Nonrheumatic mitral valve regurgitation    Nonrheumatic tricuspid valve regurgitation    Pulmonary HTN (HCC)    Nonrheumatic aortic valve insufficiency  Resolved Problems:    * No resolved hospital problems. *      Consults:  IP CONSULT TO CARDIOLOGY  IP CONSULT TO HEART FAILURE NURSE/COORDINATOR  IP CONSULT TO DIETITIAN  IP CONSULT TO INFECTIOUS DISEASES    Procedures: None    Hospital Course:  Scott Ferreira is a 45year old male with a history of CHF, cocaine abuse that Comments:   Reason for Stopping:         Multiple Vitamins-Minerals (THERAPEUTIC MULTIVITAMIN-MINERALS) tablet Comments:   Reason for Stopping:                 Note that more than 30 minutes was spent in preparing discharge papers, discussing discharge with patient, medication review, etc.    NOTE: This report was transcribed using voice recognition software.  Every effort was made to ensure accuracy; however, inadvertent computerized transcription errors may be present.     Signed:  Electronically signed by SEMAJ Kerns CNP on 5/8/2020 at 1:32 PM

## 2020-05-09 ENCOUNTER — CARE COORDINATION (OUTPATIENT)
Dept: CASE MANAGEMENT | Age: 50
End: 2020-05-09

## 2020-05-11 ENCOUNTER — CARE COORDINATION (OUTPATIENT)
Dept: CASE MANAGEMENT | Age: 50
End: 2020-05-11

## 2020-05-18 ENCOUNTER — CARE COORDINATION (OUTPATIENT)
Dept: CASE MANAGEMENT | Age: 50
End: 2020-05-18